# Patient Record
Sex: FEMALE | Race: WHITE | HISPANIC OR LATINO | URBAN - METROPOLITAN AREA
[De-identification: names, ages, dates, MRNs, and addresses within clinical notes are randomized per-mention and may not be internally consistent; named-entity substitution may affect disease eponyms.]

---

## 2017-01-08 ENCOUNTER — EMERGENCY (EMERGENCY)
Facility: HOSPITAL | Age: 37
LOS: 1 days | Discharge: HOME | End: 2017-01-08
Attending: EMERGENCY MEDICINE
Payer: MEDICAID

## 2017-01-08 VITALS
SYSTOLIC BLOOD PRESSURE: 125 MMHG | HEART RATE: 95 BPM | DIASTOLIC BLOOD PRESSURE: 82 MMHG | OXYGEN SATURATION: 100 % | RESPIRATION RATE: 18 BRPM | TEMPERATURE: 99 F

## 2017-01-08 VITALS — WEIGHT: 160.06 LBS

## 2017-01-08 DIAGNOSIS — S61.219D LACERATION WITHOUT FOREIGN BODY OF UNSPECIFIED FINGER WITHOUT DAMAGE TO NAIL, SUBSEQUENT ENCOUNTER: ICD-10-CM

## 2017-01-08 PROCEDURE — T1013: CPT

## 2017-01-08 PROCEDURE — G0463: CPT

## 2017-01-08 NOTE — ED STATDOCS - NS ED MD SCRIBE ATTENDING SCRIBE SECTIONS
REVIEW OF SYSTEMS/HISTORY OF PRESENT ILLNESS/DISPOSITION/PHYSICAL EXAM/VITAL SIGNS( Pullset)/PAST MEDICAL/SURGICAL/SOCIAL HISTORY/HIV

## 2017-01-08 NOTE — ED STATDOCS - OBJECTIVE STATEMENT
35 y/o F pt presents to ED for suture removal. Pt states sutures placed 10 days ago, she states she cut her finger while washing a knife. No fevers, no chills. No purulent discharge. No further complaints at this time.  : Moon

## 2018-01-13 ENCOUNTER — INPATIENT (INPATIENT)
Facility: HOSPITAL | Age: 38
LOS: 1 days | Discharge: HOME | DRG: 777 | End: 2018-01-15
Attending: OBSTETRICS & GYNECOLOGY | Admitting: OBSTETRICS & GYNECOLOGY
Payer: MEDICAID

## 2018-01-13 VITALS
OXYGEN SATURATION: 99 % | TEMPERATURE: 100 F | RESPIRATION RATE: 18 BRPM | SYSTOLIC BLOOD PRESSURE: 110 MMHG | DIASTOLIC BLOOD PRESSURE: 75 MMHG | WEIGHT: 154.98 LBS | HEART RATE: 104 BPM

## 2018-01-13 LAB
ANION GAP SERPL CALC-SCNC: 14 MMOL/L — SIGNIFICANT CHANGE UP (ref 5–17)
APPEARANCE UR: SIGNIFICANT CHANGE UP
APTT BLD: 26.2 SEC — LOW (ref 27.5–37.4)
BACTERIA # UR AUTO: ABNORMAL
BILIRUB UR-MCNC: NEGATIVE — SIGNIFICANT CHANGE UP
BUN SERPL-MCNC: 13 MG/DL — SIGNIFICANT CHANGE UP (ref 8–20)
CALCIUM SERPL-MCNC: 9.2 MG/DL — SIGNIFICANT CHANGE UP (ref 8.6–10.2)
CHLORIDE SERPL-SCNC: 103 MMOL/L — SIGNIFICANT CHANGE UP (ref 98–107)
CO2 SERPL-SCNC: 22 MMOL/L — SIGNIFICANT CHANGE UP (ref 22–29)
COLOR SPEC: ABNORMAL
CREAT SERPL-MCNC: 0.74 MG/DL — SIGNIFICANT CHANGE UP (ref 0.5–1.3)
DIFF PNL FLD: ABNORMAL
EPI CELLS # UR: SIGNIFICANT CHANGE UP
GLUCOSE SERPL-MCNC: 202 MG/DL — HIGH (ref 70–115)
GLUCOSE UR QL: 250
HCG SERPL-ACNC: 789.1 MIU/ML — SIGNIFICANT CHANGE UP
HCT VFR BLD CALC: 34.1 % — LOW (ref 37–47)
HGB BLD-MCNC: 11.1 G/DL — LOW (ref 12–16)
INR BLD: 1.07 RATIO — SIGNIFICANT CHANGE UP (ref 0.88–1.16)
KETONES UR-MCNC: ABNORMAL
LEUKOCYTE ESTERASE UR-ACNC: NEGATIVE — SIGNIFICANT CHANGE UP
MCHC RBC-ENTMCNC: 28.2 PG — SIGNIFICANT CHANGE UP (ref 27–31)
MCHC RBC-ENTMCNC: 32.6 G/DL — SIGNIFICANT CHANGE UP (ref 32–36)
MCV RBC AUTO: 86.8 FL — SIGNIFICANT CHANGE UP (ref 81–99)
NITRITE UR-MCNC: NEGATIVE — SIGNIFICANT CHANGE UP
PH UR: 5 — SIGNIFICANT CHANGE UP (ref 5–8)
PLATELET # BLD AUTO: 298 K/UL — SIGNIFICANT CHANGE UP (ref 150–400)
POTASSIUM SERPL-MCNC: 4.1 MMOL/L — SIGNIFICANT CHANGE UP (ref 3.5–5.3)
POTASSIUM SERPL-SCNC: 4.1 MMOL/L — SIGNIFICANT CHANGE UP (ref 3.5–5.3)
PROT UR-MCNC: 500 MG/DL
PROTHROM AB SERPL-ACNC: 11.8 SEC — SIGNIFICANT CHANGE UP (ref 9.8–12.7)
RBC # BLD: 3.93 M/UL — LOW (ref 4.4–5.2)
RBC # FLD: 14.2 % — SIGNIFICANT CHANGE UP (ref 11–15.6)
RBC CASTS # UR COMP ASSIST: >50 /HPF (ref 0–4)
SODIUM SERPL-SCNC: 139 MMOL/L — SIGNIFICANT CHANGE UP (ref 135–145)
SP GR SPEC: 1.02 — SIGNIFICANT CHANGE UP (ref 1.01–1.02)
UROBILINOGEN FLD QL: NEGATIVE — SIGNIFICANT CHANGE UP
WBC # BLD: 18.4 K/UL — HIGH (ref 4.8–10.8)
WBC # FLD AUTO: 18.4 K/UL — HIGH (ref 4.8–10.8)
WBC UR QL: SIGNIFICANT CHANGE UP

## 2018-01-13 PROCEDURE — 99284 EMERGENCY DEPT VISIT MOD MDM: CPT

## 2018-01-13 RX ORDER — SODIUM CHLORIDE 9 MG/ML
1000 INJECTION INTRAMUSCULAR; INTRAVENOUS; SUBCUTANEOUS
Qty: 0 | Refills: 0 | Status: DISCONTINUED | OUTPATIENT
Start: 2018-01-13 | End: 2018-01-14

## 2018-01-13 RX ORDER — SODIUM CHLORIDE 9 MG/ML
3 INJECTION INTRAMUSCULAR; INTRAVENOUS; SUBCUTANEOUS ONCE
Qty: 0 | Refills: 0 | Status: COMPLETED | OUTPATIENT
Start: 2018-01-13 | End: 2018-01-13

## 2018-01-13 RX ADMIN — SODIUM CHLORIDE 3 MILLILITER(S): 9 INJECTION INTRAMUSCULAR; INTRAVENOUS; SUBCUTANEOUS at 21:04

## 2018-01-13 RX ADMIN — SODIUM CHLORIDE 100 MILLILITER(S): 9 INJECTION INTRAMUSCULAR; INTRAVENOUS; SUBCUTANEOUS at 21:04

## 2018-01-13 NOTE — ED STATDOCS - OBJECTIVE STATEMENT
36 y/o F presents to the ED c/o abdominal pain and vaginal bleeding which onset this afternoon. Pt states that she started to have her period (2 months late - she states this is regular for her). Her pain came on suddenly and has been constant. Pt states that she currently has difficulty sitting and walking. Pt has taken an OTC medication for her pain 5 hours PTA. She also notes she is currently experiencing chills. Pt states that she has had unprotected sex in the past but says that she has never been pregnant. She denies fevers, chest pain, SOB, ear aches, sore throat, difficulty urinating. No further complaints at this time. 36 y/o F presents to the ED c/o abdominal pain and vaginal bleeding which onset this afternoon. Pt states that she started to have her period (2 months late - she states this is regular for her). Her pain came on suddenly and has been constant. Pt states that she currently has difficulty sitting and walking. Pt has taken an OTC medication for her pain 5 hours PTA. She also notes she is currently experiencing chills. Pt states that she has had unprotected sex in the past but says that she has never been pregnant. She denies fevers, chest pain, SOB, ear aches, sore throat, or difficulty urinating. No further complaints at this time.

## 2018-01-13 NOTE — ED STATDOCS - CARE PLAN
Principal Discharge DX:	Abdominal pain, unspecified abdominal location  Secondary Diagnosis:	Pregnancy, unspecified gestational age Principal Discharge DX:	Ectopic pregnancy  Secondary Diagnosis:	Pregnancy, unspecified gestational age

## 2018-01-13 NOTE — ED STATDOCS - ENMT NEGATIVE STATEMENT, MLM
Ears: no ear pain and no hearing problems. no ear aches Nose: no nasal congestion and no nasal drainage.Mouth/Throat: no dysphagia, no hoarseness and no throat pain. No sore throat. Neck: no lumps, no pain, no stiffness and no swollen glands.

## 2018-01-13 NOTE — ED STATDOCS - SHIFT CHANGE DETAILS
PT WITH LOWER ABDOMINAL PAIN AND VAGINAL BLEEDING. IRREGULAR MENSTRUAL CYCLE.  BETA 789 AND WBC OVER 18,000  F/U  1. ULTRASOUND - ECTOPIC VS MISCARRIAGE?  2. POSSIBLE GYN CONSULT  3. DISPO

## 2018-01-13 NOTE — ED STATDOCS - MEDICAL DECISION MAKING DETAILS
36 y/o F with vaginal bleeding and lower abdominal pain - menstrual cramps vs. spontaneous misc, vs ectopic pregnancy: Will check labs and Ultrasound. 36 y/o F with vaginal bleeding and lower abdominal pain - menstrual cramps vs. spontaneous miscarriage vs ectopic pregnancy: Will check labs and Ultrasound.

## 2018-01-13 NOTE — ED STATDOCS - PROGRESS NOTE DETAILS
recd sign out  us results and labs results discussed with patiet, seen by gyn resident and attending  plan or, patient aware

## 2018-01-14 DIAGNOSIS — O00.101 RIGHT TUBAL PREGNANCY WITHOUT INTRAUTERINE PREGNANCY: ICD-10-CM

## 2018-01-14 DIAGNOSIS — O00.90 UNSPECIFIED ECTOPIC PREGNANCY WITHOUT INTRAUTERINE PREGNANCY: ICD-10-CM

## 2018-01-14 LAB
ABO RH CONFIRMATION: SIGNIFICANT CHANGE UP
BLD GP AB SCN SERPL QL: SIGNIFICANT CHANGE UP
EOSINOPHIL # BLD AUTO: 0 K/UL — SIGNIFICANT CHANGE UP (ref 0–0.5)
EOSINOPHIL NFR BLD AUTO: 0 % — SIGNIFICANT CHANGE UP (ref 0–6)
HCT VFR BLD CALC: 22.7 % — LOW (ref 37–47)
HCT VFR BLD CALC: 33.6 % — LOW (ref 37–47)
HGB BLD-MCNC: 11.3 G/DL — LOW (ref 12–16)
HGB BLD-MCNC: 7.4 G/DL — LOW (ref 12–16)
LYMPHOCYTES # BLD AUTO: 1.1 K/UL — SIGNIFICANT CHANGE UP (ref 1–4.8)
LYMPHOCYTES # BLD AUTO: 7.1 % — LOW (ref 20–55)
MCHC RBC-ENTMCNC: 28.5 PG — SIGNIFICANT CHANGE UP (ref 27–31)
MCHC RBC-ENTMCNC: 33.6 G/DL — SIGNIFICANT CHANGE UP (ref 32–36)
MCV RBC AUTO: 84.8 FL — SIGNIFICANT CHANGE UP (ref 81–99)
MONOCYTES # BLD AUTO: 0.4 K/UL — SIGNIFICANT CHANGE UP (ref 0–0.8)
MONOCYTES NFR BLD AUTO: 2.5 % — LOW (ref 3–10)
NEUTROPHILS # BLD AUTO: 14.2 K/UL — HIGH (ref 1.8–8)
NEUTROPHILS NFR BLD AUTO: 90 % — HIGH (ref 37–73)
PLATELET # BLD AUTO: 213 K/UL — SIGNIFICANT CHANGE UP (ref 150–400)
RBC # BLD: 3.96 M/UL — LOW (ref 4.4–5.2)
RBC # FLD: 14.6 % — SIGNIFICANT CHANGE UP (ref 11–15.6)
TYPE + AB SCN PNL BLD: SIGNIFICANT CHANGE UP
WBC # BLD: 15.8 K/UL — HIGH (ref 4.8–10.8)
WBC # FLD AUTO: 15.8 K/UL — HIGH (ref 4.8–10.8)

## 2018-01-14 PROCEDURE — 88305 TISSUE EXAM BY PATHOLOGIST: CPT | Mod: 26

## 2018-01-14 PROCEDURE — 76817 TRANSVAGINAL US OBSTETRIC: CPT | Mod: 26

## 2018-01-14 PROCEDURE — 76815 OB US LIMITED FETUS(S): CPT | Mod: 26

## 2018-01-14 RX ORDER — SODIUM CHLORIDE 9 MG/ML
1000 INJECTION, SOLUTION INTRAVENOUS
Qty: 0 | Refills: 0 | Status: DISCONTINUED | OUTPATIENT
Start: 2018-01-14 | End: 2018-01-14

## 2018-01-14 RX ORDER — FENTANYL CITRATE 50 UG/ML
50 INJECTION INTRAVENOUS
Qty: 0 | Refills: 0 | Status: DISCONTINUED | OUTPATIENT
Start: 2018-01-14 | End: 2018-01-14

## 2018-01-14 RX ORDER — SODIUM CHLORIDE 9 MG/ML
999 INJECTION INTRAMUSCULAR; INTRAVENOUS; SUBCUTANEOUS ONCE
Qty: 0 | Refills: 0 | Status: COMPLETED | OUTPATIENT
Start: 2018-01-14 | End: 2018-01-14

## 2018-01-14 RX ORDER — ONDANSETRON 8 MG/1
4 TABLET, FILM COATED ORAL ONCE
Qty: 0 | Refills: 0 | Status: DISCONTINUED | OUTPATIENT
Start: 2018-01-14 | End: 2018-01-14

## 2018-01-14 RX ORDER — SODIUM CHLORIDE 9 MG/ML
1000 INJECTION INTRAMUSCULAR; INTRAVENOUS; SUBCUTANEOUS
Qty: 0 | Refills: 0 | Status: DISCONTINUED | OUTPATIENT
Start: 2018-01-14 | End: 2018-01-14

## 2018-01-14 RX ORDER — SODIUM CHLORIDE 9 MG/ML
1000 INJECTION, SOLUTION INTRAVENOUS
Qty: 0 | Refills: 0 | Status: DISCONTINUED | OUTPATIENT
Start: 2018-01-14 | End: 2018-01-15

## 2018-01-14 RX ORDER — CEFAZOLIN SODIUM 1 G
2000 VIAL (EA) INJECTION ONCE
Qty: 0 | Refills: 0 | Status: COMPLETED | OUTPATIENT
Start: 2018-01-14 | End: 2018-01-14

## 2018-01-14 RX ORDER — OXYCODONE AND ACETAMINOPHEN 5; 325 MG/1; MG/1
1 TABLET ORAL EVERY 4 HOURS
Qty: 0 | Refills: 0 | Status: DISCONTINUED | OUTPATIENT
Start: 2018-01-14 | End: 2018-01-15

## 2018-01-14 RX ADMIN — SODIUM CHLORIDE 100 MILLILITER(S): 9 INJECTION INTRAMUSCULAR; INTRAVENOUS; SUBCUTANEOUS at 10:35

## 2018-01-14 RX ADMIN — FENTANYL CITRATE 50 MICROGRAM(S): 50 INJECTION INTRAVENOUS at 11:32

## 2018-01-14 RX ADMIN — SODIUM CHLORIDE 100 MILLILITER(S): 9 INJECTION INTRAMUSCULAR; INTRAVENOUS; SUBCUTANEOUS at 04:48

## 2018-01-14 RX ADMIN — OXYCODONE AND ACETAMINOPHEN 1 TABLET(S): 5; 325 TABLET ORAL at 16:16

## 2018-01-14 RX ADMIN — OXYCODONE AND ACETAMINOPHEN 1 TABLET(S): 5; 325 TABLET ORAL at 20:51

## 2018-01-14 RX ADMIN — SODIUM CHLORIDE 999 MILLILITER(S): 9 INJECTION INTRAMUSCULAR; INTRAVENOUS; SUBCUTANEOUS at 04:33

## 2018-01-14 RX ADMIN — FENTANYL CITRATE 50 MICROGRAM(S): 50 INJECTION INTRAVENOUS at 11:30

## 2018-01-14 RX ADMIN — Medication 100 MILLIGRAM(S): at 08:15

## 2018-01-14 NOTE — H&P ADULT - NSHPLABSRESULTS_GEN_ALL_CORE
11.1   18.4  )-----------( 298      ( 13 Jan 2018 21:11 )             34.1       HCG Quantitative, Serum: 789.1 mIU/mL (01-13-18 @ 21:11)    PROCEDURE DATE:  01/14/2018          INTERPRETATION:  Clinical indication: Vaginal bleeding, severe pain,   serum beta-hCG 789.1. Unknown LMP.    Technique:  Transabdominal and transvaginal ultrasound of the pelvis was   performed. Grayscale, color Doppler and spectral Doppler were utilized.     Comparison: None.    Findings:     Uterus: Measures 9.9 x 5.8 x 5.7 cm. A 1.1 x 1.2 x 1.0 cm nonshadowing,   homogeneously echogenic focus in the left uterus, which may represent a   lipoleiomyoma.  Endometrium: Measures 1.9 cm in thickness. No intrauterine gestation   identified.  Right ovary: Right ovary was not visualized, limiting evaluation.   Complex, heterogeneously echogenic structure (approximately measuring 5.8   x 8.0 x 9.6 cm) in the right adnexa and small to moderate free fluid,   suspicious for hemoperitoneum.   Left ovary: Measures 2.1 x 2.4 x 2.2 cm. Unremarkable. Flow present.      Impression:    Findings suspicious for ruptured right ectopic pregnancy and   hemoperitoneum.    Discussed with Dr. Quezada at 3:45 AM on 1/14/2018.                JEAN-CLAUDE KIDD M.D., ATTENDING RADIOLOGIST  This document has been electronically signed. Jan 14 2018  3:52AM

## 2018-01-14 NOTE — H&P ADULT - HISTORY OF PRESENT ILLNESS
36 yo  here for RLQ pain starting 2 pm. She has been spotting for 1 week and today it became heavier after the pain started. Denies n/v/ dizziness. She does not have a gyn or PCP    G      Had lymphoma. Was treated with chemo at [facility]. Last treatment 1 yr ago. Had a port placed and removed.

## 2018-01-14 NOTE — H&P ADULT - NSHPPHYSICALEXAM_GEN_ALL_CORE
Gen: stable appearing woman  Abd: RLQ tenderness, no rebound, + guarding, no distention  Vaginal: normal external genitalia, normal cervix, around 20 cc clot/blood in vault, mucus/blood coming out of closed os.

## 2018-01-14 NOTE — BRIEF OPERATIVE NOTE - PROCEDURE
<<-----Click on this checkbox to enter Procedure Laparoscopic right salpingectomy for ectopic pregnancy  01/14/2018    Active  [id number]

## 2018-01-15 VITALS
OXYGEN SATURATION: 98 % | RESPIRATION RATE: 18 BRPM | DIASTOLIC BLOOD PRESSURE: 70 MMHG | HEART RATE: 82 BPM | SYSTOLIC BLOOD PRESSURE: 120 MMHG | TEMPERATURE: 99 F

## 2018-01-15 LAB
BASOPHILS # BLD AUTO: 0 K/UL — SIGNIFICANT CHANGE UP (ref 0–0.2)
BASOPHILS NFR BLD AUTO: 0.1 % — SIGNIFICANT CHANGE UP (ref 0–2)
EOSINOPHIL # BLD AUTO: 0 K/UL — SIGNIFICANT CHANGE UP (ref 0–0.5)
EOSINOPHIL NFR BLD AUTO: 0 % — SIGNIFICANT CHANGE UP (ref 0–6)
HCT VFR BLD CALC: 30 % — LOW (ref 37–47)
HGB BLD-MCNC: 10 G/DL — LOW (ref 12–16)
LYMPHOCYTES # BLD AUTO: 18.4 % — LOW (ref 20–55)
LYMPHOCYTES # BLD AUTO: 3 K/UL — SIGNIFICANT CHANGE UP (ref 1–4.8)
MCHC RBC-ENTMCNC: 28.7 PG — SIGNIFICANT CHANGE UP (ref 27–31)
MCHC RBC-ENTMCNC: 33.3 G/DL — SIGNIFICANT CHANGE UP (ref 32–36)
MCV RBC AUTO: 86.2 FL — SIGNIFICANT CHANGE UP (ref 81–99)
MONOCYTES # BLD AUTO: 1.1 K/UL — HIGH (ref 0–0.8)
MONOCYTES NFR BLD AUTO: 6.6 % — SIGNIFICANT CHANGE UP (ref 3–10)
NEUTROPHILS # BLD AUTO: 12 K/UL — HIGH (ref 1.8–8)
NEUTROPHILS NFR BLD AUTO: 74.7 % — HIGH (ref 37–73)
PLATELET # BLD AUTO: 212 K/UL — SIGNIFICANT CHANGE UP (ref 150–400)
RBC # BLD: 3.48 M/UL — LOW (ref 4.4–5.2)
RBC # FLD: 15.3 % — SIGNIFICANT CHANGE UP (ref 11–15.6)
WBC # BLD: 16.1 K/UL — HIGH (ref 4.8–10.8)
WBC # FLD AUTO: 16.1 K/UL — HIGH (ref 4.8–10.8)

## 2018-01-15 PROCEDURE — T1013: CPT

## 2018-01-15 PROCEDURE — 88305 TISSUE EXAM BY PATHOLOGIST: CPT

## 2018-01-15 PROCEDURE — 80048 BASIC METABOLIC PNL TOTAL CA: CPT

## 2018-01-15 PROCEDURE — 85610 PROTHROMBIN TIME: CPT

## 2018-01-15 PROCEDURE — 76801 OB US < 14 WKS SINGLE FETUS: CPT

## 2018-01-15 PROCEDURE — 36430 TRANSFUSION BLD/BLD COMPNT: CPT

## 2018-01-15 PROCEDURE — 86900 BLOOD TYPING SEROLOGIC ABO: CPT

## 2018-01-15 PROCEDURE — P9016: CPT

## 2018-01-15 PROCEDURE — 86850 RBC ANTIBODY SCREEN: CPT

## 2018-01-15 PROCEDURE — 76830 TRANSVAGINAL US NON-OB: CPT

## 2018-01-15 PROCEDURE — 99285 EMERGENCY DEPT VISIT HI MDM: CPT | Mod: 25

## 2018-01-15 PROCEDURE — 86920 COMPATIBILITY TEST SPIN: CPT

## 2018-01-15 PROCEDURE — 85730 THROMBOPLASTIN TIME PARTIAL: CPT

## 2018-01-15 PROCEDURE — 36415 COLL VENOUS BLD VENIPUNCTURE: CPT

## 2018-01-15 PROCEDURE — 85027 COMPLETE CBC AUTOMATED: CPT

## 2018-01-15 PROCEDURE — 86901 BLOOD TYPING SEROLOGIC RH(D): CPT

## 2018-01-15 PROCEDURE — 81001 URINALYSIS AUTO W/SCOPE: CPT

## 2018-01-15 PROCEDURE — 84702 CHORIONIC GONADOTROPIN TEST: CPT

## 2018-01-15 PROCEDURE — 85018 HEMOGLOBIN: CPT

## 2018-01-15 RX ORDER — FERROUS SULFATE 325(65) MG
1 TABLET ORAL
Qty: 30 | Refills: 0 | OUTPATIENT
Start: 2018-01-15 | End: 2018-02-13

## 2018-01-15 RX ORDER — IBUPROFEN 200 MG
1 TABLET ORAL
Qty: 30 | Refills: 0 | OUTPATIENT
Start: 2018-01-15 | End: 2018-01-24

## 2018-01-15 RX ADMIN — OXYCODONE AND ACETAMINOPHEN 1 TABLET(S): 5; 325 TABLET ORAL at 00:01

## 2018-01-15 RX ADMIN — OXYCODONE AND ACETAMINOPHEN 1 TABLET(S): 5; 325 TABLET ORAL at 00:55

## 2018-01-15 NOTE — DISCHARGE NOTE ADULT - CARE PLAN
Principal Discharge DX:	Right tubal pregnancy without intrauterine pregnancy  Goal:	resolved  Instructions for follow-up, activity and diet:	-Patient was taken to the OR due to rupture ectopic pregnancy, laparoscopy procedure was done without complications  -take your medications as prescribe by your doctor  -f/u with your PMD in 5 days  -f/u with your OBGY doctor in 7 days Principal Discharge DX:	Right tubal pregnancy without intrauterine pregnancy  Goal:	resolved  Assessment and plan of treatment:	-Patient was taken to the OR due to rupture ectopic pregnancy, laparoscopy procedure was done without complications  -take your medications as prescribe by your doctor  -f/u with your PMD in 5 days  -f/u with your OBGY doctor in 7 days

## 2018-01-15 NOTE — PROGRESS NOTE ADULT - PROBLEM SELECTOR PLAN 1
-continue routine care  -encourage ambulation and PO hydratition  -F/U CBC in the AM  -discharge home if stable H&H  -pain is well controlled

## 2018-01-15 NOTE — PROGRESS NOTE ADULT - SUBJECTIVE AND OBJECTIVE BOX
37y  s/p laparoscopy procedure day for rupture ectopic day #1  Patient seen and examined at bedside, no acute overnight events.   Patient is ambulating, +eating, +PO hydration, +voiding, +Flatus, -BM,pain is well controlled. Pt report some abdominal distention   Denies headache, SOB, fever, chills and calf pain, no vaginal bleeding       Vital Signs Last 24 Hrs  T(C): 36.9 (15 Piotr 2018 00:18), Max: 37.6 (2018 19:17)  T(F): 98.4 (15 Piotr 2018 00:18), Max: 99.6 (2018 19:17)  HR: 89 (15 Piotr 2018 00:18) (68 - 102)  BP: 93/53 (15 Piotr 2018 00:18) (93/53 - 133/68)  RR: 16 (15 Piotr 2018 00:18) (15 - 19)  SpO2: 96% (15 Piotr 2018 00:18) (95% - 100%)    Physical Exam:  General: NAD  CVS: RRR, +S1/S2  Lungs: CTAB, no wheeze, ronchi or rales.   Abdomen: +BS, soft, ND, minimally tender, no guarding, no rebound tenderness   Pelvic: no vaginal bleeding   Ext: No cyanosis, edema or calf tenderness.     Labs:                        11.3   15.8  )-----------( 213      ( 2018 15:54 )             33.6     Urinalysis Basic - ( 2018 21:10 )    Color: Red / Appearance: bloody / S.025 / pH: x  Gluc: x / Ketone: Small  / Bili: Negative / Urobili: Negative   Blood: x / Protein: 500 mg/dL / Nitrite: Negative   Leuk Esterase: Negative / RBC: >50 /HPF / WBC 0-2   Sq Epi: x / Non Sq Epi: Occasional / Bacteria: Occasional        Medication:  MEDICATIONS  (STANDING):  dextrose 5% + lactated ringers. 1000 milliLiter(s) (125 mL/Hr) IV Continuous <Continuous>    MEDICATIONS  (PRN):  oxyCODONE    5 mG/acetaminophen 325 mG 1 Tablet(s) Oral every 4 hours PRN Moderate Pain

## 2018-01-15 NOTE — DISCHARGE NOTE ADULT - PLAN OF CARE
resolved -Patient was taken to the OR due to rupture ectopic pregnancy, laparoscopy procedure was done without complications  -take your medications as prescribe by your doctor  -f/u with your PMD in 5 days  -f/u with your OBGY doctor in 7 days

## 2018-01-15 NOTE — DISCHARGE NOTE ADULT - CARE PROVIDER_API CALL
Jason Castellon), [facility] Obstetrics and Gynecology  [address] [place] [address]  Phone: [phone]  Fax: [phone]

## 2018-01-15 NOTE — DISCHARGE NOTE ADULT - MEDICATION SUMMARY - MEDICATIONS TO TAKE
I will START or STAY ON the medications listed below when I get home from the hospital:    ibuprofen 600 mg oral tablet  -- 1 tab(s) by mouth every 8 hours   -- Do not take this drug if you are pregnant.  It is very important that you take or use this exactly as directed.  Do not skip doses or discontinue unless directed by your doctor.  May cause drowsiness or dizziness.  Obtain medical advice before taking any non-prescription drugs as some may affect the action of this medication.  Take with food or milk.    -- Indication: For Pain     ferrous sulfate 325 mg (65 mg elemental iron) oral tablet  -- 1 tab(s) by mouth once a day   -- Check with your doctor before becoming pregnant.  Do not chew, break, or crush.  May discolor urine or feces.    -- Indication: For anemia I will START or STAY ON the medications listed below when I get home from the hospital:    ibuprofen 600 mg oral tablet  -- 1 tab(s) by mouth every 8 hours   -- Do not take this drug if you are pregnant.  It is very important that you take or use this exactly as directed.  Do not skip doses or discontinue unless directed by your doctor.  May cause drowsiness or dizziness.  Obtain medical advice before taking any non-prescription drugs as some may affect the action of this medication.  Take with food or milk.    -- Indication: For Pain    ferrous sulfate 325 mg (65 mg elemental iron) oral tablet  -- 1 tab(s) by mouth once a day   -- Check with your doctor before becoming pregnant.  Do not chew, break, or crush.  May discolor urine or feces.    -- Indication: For Anemia

## 2018-01-15 NOTE — DISCHARGE NOTE ADULT - HOSPITAL COURSE
38 yo  here for RLQ pain starting 2 pm. She has been spotting for 1 week and today it became heavier after the pain started. Denies n/v/ dizziness. She does not have a gyn or PCP. PMH of  lymphoma. Was treated with chemo at [facility]. Last treatment 1 yr ago. Had a port placed and removed. Pt was taken to the OR for laparoscopy procedure done without complications , pt was kept in the hospital overnight for observation and repeat CBC in the AM.

## 2018-01-15 NOTE — PROGRESS NOTE ADULT - SUBJECTIVE AND OBJECTIVE BOX
Post-Op Day# 1  S/P GA for Lap Right Salpingectomy    A&Ox3, vss, no complaints  Denies any N/V  Pain well controlled with prn meds  No apparent anesthesia complications    IVF LR@125cc/hr    Vital Signs Last 24 Hrs  T(C): 37 (15 Piotr 2018 07:46), Max: 37.6 (14 Jan 2018 19:17)  T(F): 98.6 (15 Piotr 2018 07:46), Max: 99.6 (14 Jan 2018 19:17)  HR: 82 (15 Piotr 2018 07:46) (68 - 102)  BP: 120/70 (15 Piotr 2018 07:46) (93/53 - 133/68)  BP(mean): --  RR: 18 (15 Piotr 2018 07:46) (15 - 19)  SpO2: 98% (15 Piotr 2018 07:46) (95% - 100%)

## 2018-01-15 NOTE — DISCHARGE NOTE ADULT - OTHER SIGNIFICANT FINDINGS
11.3   15.8  )-----------( 213      ( 2018 15:54 )             33.6   01-13    139  |  103  |  13.0  ----------------------------<  202<H>  4.1   |  22.0  |  0.74    Ca    9.2      2018 21:11    Urinalysis Basic - ( 2018 21:10 )    Color: Red / Appearance: bloody / S.025 / pH: x  Gluc: x / Ketone: Small  / Bili: Negative / Urobili: Negative   Blood: x / Protein: 500 mg/dL / Nitrite: Negative   Leuk Esterase: Negative / RBC: >50 /HPF / WBC 0-2   Sq Epi: x / Non Sq Epi: Occasional / Bacteria: Occasional    US Transvaginal (18 @ 02:37) >  Uterus: Measures 9.9 x 5.8 x 5.7 cm. A 1.1 x 1.2 x 1.0 cm nonshadowing,   homogeneously echogenic focus in the left uterus, which may represent a   lipoleiomyoma.  Endometrium: Measures 1.9 cm in thickness. No intrauterine gestation   identified.  Right ovary: Right ovary was not visualized, limiting evaluation.   Complex, heterogeneously echogenic structure (approximately measuring 5.8   x 8.0 x 9.6 cm) in the right adnexa and small to moderate free fluid,   suspicious for hemoperitoneum.   Left ovary: Measures 2.1 x 2.4 x 2.2 cm. Unremarkable. Flow present.      Impression:  Findings suspicious for ruptured right ectopic pregnancy and   hemoperitoneum.

## 2018-01-15 NOTE — DISCHARGE NOTE ADULT - PATIENT PORTAL LINK FT
“You can access the [employer] Patient Portal, offered by [facility], by registering with the following website: [email or web address]”

## 2018-01-19 LAB — SURGICAL PATHOLOGY FINAL REPORT - CH: SIGNIFICANT CHANGE UP

## 2019-11-07 NOTE — ED STATDOCS - NS ED MD DISPO ISOLATION TYPES
None Advancement Flap (Single) Text: The defect edges were debeveled with a #15 scalpel blade.  Given the location of the defect and the proximity to free margins a single advancement flap was deemed most appropriate.  Using a sterile surgical marker, an appropriate advancement flap was drawn incorporating the defect and placing the expected incisions within the relaxed skin tension lines where possible.    The area thus outlined was incised deep to adipose tissue with a #15 scalpel blade.  The skin margins were undermined to an appropriate distance in all directions utilizing iris scissors.

## 2023-11-16 NOTE — ED ADULT NURSE NOTE - TEMPLATE LIST FOR HEAD TO TOE ASSESSMENT
Now following w Cardiology (Gil)  Echo, stress testing reviewed  Recent BMP showed improved renal function, normal K level  Refill Lasix, will continue at 40mg daily  Cont ASA, coreg, Losartan, Farxiga- samples were provided for Farxiga  Discussed heart healthy diet, physical activity as tolerated   Abdominal Pain, N/V/D

## 2024-06-20 NOTE — DISCHARGE NOTE ADULT - NS AS DC AMI YN
no
patient aox3, breathing even and unlabored, patient c/o lower back abscess x3 days. purulent drainage noted, +redness and swelling. patient has small scabbed wounds throughout legs as well. unable to lay comfortably on his back. sepsis work up completed. patient admits to crack and crystal meth use,